# Patient Record
Sex: MALE | Race: BLACK OR AFRICAN AMERICAN
[De-identification: names, ages, dates, MRNs, and addresses within clinical notes are randomized per-mention and may not be internally consistent; named-entity substitution may affect disease eponyms.]

---

## 2019-06-29 ENCOUNTER — HOSPITAL ENCOUNTER (INPATIENT)
Dept: HOSPITAL 74 - YASAS | Age: 59
LOS: 3 days | Discharge: HOME | End: 2019-07-02
Attending: SURGERY | Admitting: SURGERY
Payer: COMMERCIAL

## 2019-06-29 VITALS — BODY MASS INDEX: 18.5 KG/M2

## 2019-06-29 DIAGNOSIS — F39: ICD-10-CM

## 2019-06-29 DIAGNOSIS — F10.230: Primary | ICD-10-CM

## 2019-06-29 DIAGNOSIS — F19.282: ICD-10-CM

## 2019-06-29 DIAGNOSIS — R03.0: ICD-10-CM

## 2019-06-29 DIAGNOSIS — F19.24: ICD-10-CM

## 2019-06-29 DIAGNOSIS — F14.20: ICD-10-CM

## 2019-06-29 DIAGNOSIS — F12.20: ICD-10-CM

## 2019-06-29 DIAGNOSIS — F31.9: ICD-10-CM

## 2019-06-29 DIAGNOSIS — F17.210: ICD-10-CM

## 2019-06-29 PROCEDURE — HZ2ZZZZ DETOXIFICATION SERVICES FOR SUBSTANCE ABUSE TREATMENT: ICD-10-PCS | Performed by: SURGERY

## 2019-06-29 RX ADMIN — Medication SCH MG: at 23:11

## 2019-06-29 RX ADMIN — NICOTINE SCH MG: 21 PATCH TRANSDERMAL at 13:15

## 2019-06-29 RX ADMIN — Medication PRN MG: at 23:11

## 2019-06-29 NOTE — HP
CIWA Score


Nausea/Vomitin


Muscle Tremors: 2


Anxiety: 2


Agitation: 2


Paroxysmal Sweats: 2


Orientation: 1-Uncertain about Date


Tacttile Disturbances: 2-Mild Itch/Numbness/Burn


Auditory Disturbances: 1-Very Mild


Visual Disturbances: 1-Very Mild Sensitivity


Headache: 2-Mild


CIWA-Ar Total Score: 17





- Admission Criteria


[facility] Guidelines: Admission for Medically Managed Detox: 


Requires at least one of the followin. CIWA greater than 12


2. Seizures within the past 24 hours


3. Delirium tremens within the past 24 hours


4. Hallucinations within the past 24 hours


5. Acute intervention needed for co  occurring medical disorder


6. Acute intervention needed for co  occurring psychiatric disorder


7. Severe withdrawal that cannot be handled at a lower level of care (continued


    vomiting, continued diarrhea, abnormal vital signs) requiring intravenous


    medication and/or fluids


8. Pregnancy





Patient presents the following: CIWA greater than 12


Admission Criteria Met: Admission criteria met





Admission ROS [facility]





- HPI


Chief Complaint: 





I need help, I messed up 


Allergies/Adverse Reactions: 


 Allergies











Allergy/AdvReac Type Severity Reaction Status Date / Time


 


No Known Allergies Allergy   Verified 19 10:58











History of Present Illness: 





59 year old man with alcohol dependence with last treatment 5 years ago 

presents for detox. He reports sobriety until 8 months ago. He denies seizure 

related to intoxication but reports several black outs from drinking.


Exam Limitations: No Limitations





- Ebola screening


Have you traveled outside of the country in the last 21 days: No (N)


Have you had contact with anyone from an Ebola affected area: No


Have you been sick,other than usual withdrawal symptoms: No


Do you have a fever: No





- Review of Systems


Constitutional: Chills, Loss of Appetite, Changes in sleep, Unintentional Wgt. 

Loss


EENT: reports: No Symptoms Reported (adequate with eye glasses)


Respiratory: reports: Cough (smoker's)


Cardiac: reports: No Symptoms Reported


GI: reports: Poor Appetite, Poor Fluid Intake, Abdominal cramping


: reports: No Symptoms Reported


Musculoskeletal: reports: Joint Pain, Muscle Pain, Muscle Weakness


Integumentary: reports: Flushing


Neuro: reports: Headache, Numbness, Tremors


Endocrine: reports: No Symptoms Reported


Hematology: reports: No Symptoms Reported


Psychiatric: reports: Anxious, Depressed (manic)


Other Systems: Reviewed and Negative





Patient History





- Patient Medical History


Hx Anemia: No


Hx Asthma: No


Hx Chronic Obstructive Pulmonary Disease (COPD): No


Hx Cancer: No


Hx Cardiac Disorders: No


Hx Congestive Heart Failure: No


Hx Hypertension: No


Hx Hypercholesterolemia: No


Hx Pacemaker: No


HX Cerebrovascular Accident: No


Hx Seizures: No


Hx Dementia: No


Hx Diabetes: No


Hx Gastrointestinal Disorders: No


Hx Liver Disease: No


Hx Genitourinary Disorders: No


Hx Sexually Transmitted Disorders: No


Hx Renal Disease (ESRD): No


Hx Thyroid Disease: No


Hx Human Immunodeficiency Virus (HIV): No


Hx Hepatitis C: No


Hx Depression: Yes


Hx Suicide Attempt: No


Hx Bipolar Disorder: Yes


Hx Schizophrenia: No





- Patient Surgical History


Past Surgical History: No





- PPD History


Previous Implant?: No


Documented Results: Negative w/o proof


Implanted On Prior [facility] Admission?: No


PPD to be Administered?: Yes





- Smoking Cessation


Smoking history: Current every day smoker


Have you smoked in the past 12 months: Yes


Aproximately how many cigarettes per day: 30


Hx Chewing Tobacco Use: No


Initiated information on smoking cessation: Yes


'Breaking Loose' booklet given: 19





- Substances abused


  ** Cocaine


Substance route: Inhalation


Frequency: Daily


Amount used: $60-70/day  1-2grams/day


Age of first use: 11


Date of last use: 19





  ** Alcohol


Substance route: Oral


Frequency: Daily


Amount used: 1 quart vodka/day


Age of first use: 9


Date of last use: 19





  ** Marijuana/Hashish


Substance route: Smoking


Frequency: Daily


Amount used: $35-$50/day


Age of first use: 6


Date of last use: 19





Family Disease History





- Family Disease History


Family Disease History: Other: Father (alcohol), Mother (alcohol)





Admission Physical Exam [facility]





- Vital Signs


Vital Signs: 


 Vital Signs - 24 hr











  19





  11:01


 


Temperature 96.9 F L


 


Pulse Rate 75


 


Respiratory 18





Rate 


 


Blood Pressure 101/74














- Physical


General Appearance: Yes: No Apparent Distress


HEENTM: Yes: Hearing grossly Normal, Normal ENT Inspection, CRISTAL, Pharynx Normal

, Nasal Congestion


Respiratory: Yes: Chest Non-Tender, Lungs Clear, Normal Breath Sounds, No 

Respiratory Distress, No Accessory Muscle Use


Neck: Yes: No masses,lesions,Nodules, Supple


Breast: Yes: Breast Exam Deferred


Cardiology: Yes: Regular Rhythm, Regular Rate, S1, S2


Abdominal: Yes: Normal Bowel Sounds, Non Tender, Flat, Soft


Genitourinary: Yes: Within Normal Limits


Back: Yes: Normal Inspection


Musculoskeletal: Yes: full range of Motion, Gait Steady, Pelvis Stable


Extremities: Yes: Normal Capillary Refill, Normal Inspection, Normal Range of 

Motion, Non-Tender, Tremors


Neurological: Yes: CNs II-XII NML intact, Fully Oriented, Alert, Motor Strength 

5/5, Normal Mood/Affect, Normal Response


Integumentary: Yes: Normal Color, Clammy


Lymphatic: Yes: Within Normal Limits





- Diagnostic


(1) Marijuana dependence


Current Visit: Yes   Status: Chronic   





(2) Cocaine abuse


Current Visit: Yes   Status: Chronic   





(3) Nicotine dependence


Current Visit: Yes   Status: Acute   


Qualifiers: 


   Nicotine product type: cigarettes   Substance use status: uncomplicated   

Qualified Code(s): F17.210 - Nicotine dependence, cigarettes, uncomplicated   





(4) Alcohol dependence


Current Visit: No   Status: Acute   


Qualifiers: 


   Substance use status: uncomplicated   Qualified Code(s): F10.20 - Alcohol 

dependence, uncomplicated   





(5) Manic depression


Current Visit: Yes   Status: Acute   


Qualifiers: 


   Active/Remission status: currently active 





Cleared for Admission S





- Detox or Rehab


[facility] Level of Care: Medically Managed


Detox Regimen/Protocol: Librium





Breathalyzer





- Breathalyzer


Breathalyzer: 0.073





Urine Drug Screen





- Test Device


Lot number: [id number]


Expiration date: 21





- Control


Is test valid?: Yes





- Results


Drug screen NEGATIVE: No


Urine drug screen results: THC-Marijuana, YEVGENIY-Cocaine





Inpatient Rehab Admission





- Rehab Decision to Admit


Inpatient rehab admission?: No

## 2019-06-30 LAB
ALBUMIN SERPL-MCNC: 2.9 G/DL (ref 3.4–5)
ALP SERPL-CCNC: 70 U/L (ref 45–117)
ALT SERPL-CCNC: 27 U/L (ref 13–61)
ANION GAP SERPL CALC-SCNC: 4 MMOL/L (ref 8–16)
AST SERPL-CCNC: 27 U/L (ref 15–37)
BILIRUB SERPL-MCNC: 1 MG/DL (ref 0.2–1)
BUN SERPL-MCNC: 14.1 MG/DL (ref 7–18)
CALCIUM SERPL-MCNC: 8.6 MG/DL (ref 8.5–10.1)
CHLORIDE SERPL-SCNC: 107 MMOL/L (ref 98–107)
CO2 SERPL-SCNC: 31 MMOL/L (ref 21–32)
CREAT SERPL-MCNC: 1 MG/DL (ref 0.55–1.3)
DEPRECATED RDW RBC AUTO: 14.5 % (ref 11.9–15.9)
GLUCOSE SERPL-MCNC: 80 MG/DL (ref 74–106)
HCT VFR BLD CALC: 44.9 % (ref 35.4–49)
HGB BLD-MCNC: 15.1 GM/DL (ref 11.7–16.9)
MCH RBC QN AUTO: 30.9 PG (ref 25.7–33.7)
MCHC RBC AUTO-ENTMCNC: 33.7 G/DL (ref 32–35.9)
MCV RBC: 91.7 FL (ref 80–96)
PLATELET # BLD AUTO: 172 K/MM3 (ref 134–434)
PMV BLD: 9.1 FL (ref 7.5–11.1)
POTASSIUM SERPLBLD-SCNC: 4 MMOL/L (ref 3.5–5.1)
PROT SERPL-MCNC: 5.8 G/DL (ref 6.4–8.2)
RBC # BLD AUTO: 4.89 M/MM3 (ref 4–5.6)
SODIUM SERPL-SCNC: 141 MMOL/L (ref 136–145)
WBC # BLD AUTO: 3.3 K/MM3 (ref 4–10)

## 2019-06-30 RX ADMIN — Medication SCH TAB: at 10:52

## 2019-06-30 RX ADMIN — Medication PRN MG: at 22:52

## 2019-06-30 RX ADMIN — Medication SCH MG: at 22:52

## 2019-06-30 RX ADMIN — NICOTINE SCH MG: 21 PATCH TRANSDERMAL at 10:52

## 2019-06-30 NOTE — PN
[facility] CIWA





- CIWA Score


Nausea/Vomitin-Mild Nausea/No Vomiting


Muscle Tremors: 4-Moderate,w/Arms Extend


Anxiety: 4-Mod. Anxious/Guarded


Agitation: 3


Paroxysmal Sweats: 3


Orientation: 0-Oriented


Tacttile Disturbances: 0-None


Auditory Disturbances: 0-None


Visual Disturbances: 0-None


Headache: 0-None Present


CIWA-Ar Total Score: 15





[facility] Progress Note (SOAP)


Subjective: 





Interrupted sleep; patient stated withdrawal symptoms controlled with 

medication. Request ensure (ordered as per patient's request).


Objective: 





19 15:54


 Last Vital Signs











Temp Pulse Resp BP Pulse Ox


 


 98.2 F   97 H  18   151/81    


 


 19 14:25  19 14:25  19 14:25  19 14:25   








Elevated b/p (denies htn, not on medication; could be r/t withdrawal)





 Laboratory Tests











  19





  07:30 07:30 07:30


 


WBC  3.3 L  


 


RBC  4.89  


 


Hgb  15.1  


 


Hct  44.9  


 


MCV  91.7  


 


MCH  30.9  


 


MCHC  33.7  


 


RDW  14.5  


 


Plt Count  172  


 


MPV  9.1  


 


Sodium   141 


 


Potassium   4.0 


 


Chloride   107 


 


Carbon Dioxide   31 


 


Anion Gap   4 L 


 


BUN   14.1 


 


Creatinine   1.0 


 


Est GFR (CKD-EPI)AfAm   93.73 


 


Est GFR (CKD-EPI)NonAf   80.87 


 


Random Glucose   80 


 


Calcium   8.6 


 


Total Bilirubin   1.0 


 


AST   27 


 


ALT   27 


 


Alkaline Phosphatase   70 


 


Total Protein   5.8 L 


 


Albumin   2.9 L 


 


RPR Titer    Nonreactive


 


HIV 1&2 Antibody Screen   


 


HIV P24 Antigen   














  19





  07:30


 


WBC 


 


RBC 


 


Hgb 


 


Hct 


 


MCV 


 


MCH 


 


MCHC 


 


RDW 


 


Plt Count 


 


MPV 


 


Sodium 


 


Potassium 


 


Chloride 


 


Carbon Dioxide 


 


Anion Gap 


 


BUN 


 


Creatinine 


 


Est GFR (CKD-EPI)AfAm 


 


Est GFR (CKD-EPI)NonAf 


 


Random Glucose 


 


Calcium 


 


Total Bilirubin 


 


AST 


 


ALT 


 


Alkaline Phosphatase 


 


Total Protein 


 


Albumin 


 


RPR Titer 


 


HIV 1&2 Antibody Screen  Negative


 


HIV P24 Antigen  Negative








Labs reviewed


Assessment: 





19 16:01


Withdrawal symptoms





Noted with elevated b/p


Plan: 





Continue detox


Encouraged PO water intake





Elevated b/p: could be r/t withdrawal, start clonidine prn, follow up with PCP 

for management
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
## 2019-07-01 RX ADMIN — Medication SCH MG: at 22:22

## 2019-07-01 RX ADMIN — NICOTINE SCH: 21 PATCH TRANSDERMAL at 10:14

## 2019-07-01 RX ADMIN — Medication PRN MG: at 22:22

## 2019-07-01 RX ADMIN — Medication SCH TAB: at 10:14

## 2019-07-01 NOTE — PN
S CIWA





- CIWA Score


Nausea/Vomitin-No Nausea/No Vomiting


Muscle Tremors: 1-None Visible, but Felt


Anxiety: 1-Mildly Anxious


Agitation: 1-Slight > Activity


Paroxysmal Sweats: 1-Minimal Palms Moist


Orientation: 0-Oriented


Tacttile Disturbances: 0-None


Auditory Disturbances: 0-None


Visual Disturbances: 0-None


Headache: 0-None Present


CIWA-Ar Total Score: 4





[facility] Progress Note (SOAP)


Subjective: 





sweats


anxiety


Objective: 





19 13:09


 Vital Signs











Temperature  96.3 F L  19 13:05


 


Pulse Rate  57 L  19 13:05


 


Respiratory Rate  18   19 13:05


 


Blood Pressure  142/92   19 13:05


 


O2 Sat by Pulse Oximetry (%)      








[id number]


ambulating


no acute distress


Assessment: 





19 13:09


mild withdrawal sx


Plan: 





continue detox


increase fluids


d/c in am

## 2019-07-02 VITALS — HEART RATE: 74 BPM | DIASTOLIC BLOOD PRESSURE: 60 MMHG | SYSTOLIC BLOOD PRESSURE: 116 MMHG | TEMPERATURE: 97.5 F

## 2019-07-02 NOTE — DS
[facility] Detox Discharge Summary


Admission Date: 


06/29/19





Discharge Date: 07/02/19





- History


Present History: Alcohol Dependence, Cannabis Dependence, Cocaine Dependence





- Physical Exam Results


Vital Signs: 


 Vital Signs











Temperature  97.5 F L  07/02/19 09:32


 


Pulse Rate  74   07/02/19 09:32


 


Respiratory Rate  18   07/02/19 09:32


 


Blood Pressure  116/60   07/02/19 09:32


 


O2 Sat by Pulse Oximetry (%)      














- Treatment


Hospital Course: Detox Protocol Followed, Detoxed Safely, Responded well, 

Discharged Condition Good, Rehab Referral Accepted





- Medication


Discharge Medications: 


Ambulatory Orders





NK [No Known Home Medication]  06/29/19 











- Diagnosis


(1) Alcohol dependence with uncomplicated withdrawal


Current Visit: Yes   Status: Chronic   





(2) Cannabis dependence


Current Visit: Yes   Status: Chronic   





(3) Cocaine dependence


Current Visit: Yes   Status: Chronic   


Qualifiers: 


   Substance use status: uncomplicated   Qualified Code(s): F14.20 - Cocaine 

dependence, uncomplicated   





(4) Manic depression


Current Visit: Yes   Status: Acute   


Qualifiers: 


   Active/Remission status: currently active 





(5) Other psychoactive substance abuse with psychoactive substance-induced mood 

disorder


Current Visit: Yes   Status: Acute   





(6) Substance-induced sleep disorder


Current Visit: Yes   Status: Acute   





(7) Mood disorder


Current Visit: Yes   Status: Chronic   





(8) Nicotine dependence


Current Visit: Yes   Status: Chronic   


Qualifiers: 


   Nicotine product type: cigarettes   Substance use status: uncomplicated   

Qualified Code(s): F17.210 - Nicotine dependence, cigarettes, uncomplicated   





(9) Mood Disorder NOS


Current Visit: No   Status: Acute   





- AMA


Did Patient Leave Against Medical Advice: No ([facility] referred to [facility] 

[facility])